# Patient Record
Sex: FEMALE | Race: OTHER | HISPANIC OR LATINO
[De-identification: names, ages, dates, MRNs, and addresses within clinical notes are randomized per-mention and may not be internally consistent; named-entity substitution may affect disease eponyms.]

---

## 2019-12-30 PROBLEM — Z00.00 ENCOUNTER FOR PREVENTIVE HEALTH EXAMINATION: Status: ACTIVE | Noted: 2019-12-30

## 2020-02-11 ENCOUNTER — APPOINTMENT (OUTPATIENT)
Dept: GASTROENTEROLOGY | Facility: CLINIC | Age: 45
End: 2020-02-11

## 2020-02-14 ENCOUNTER — APPOINTMENT (OUTPATIENT)
Dept: GASTROENTEROLOGY | Facility: CLINIC | Age: 45
End: 2020-02-14

## 2020-03-02 ENCOUNTER — APPOINTMENT (OUTPATIENT)
Dept: GASTROENTEROLOGY | Facility: CLINIC | Age: 45
End: 2020-03-02
Payer: COMMERCIAL

## 2020-03-02 VITALS
OXYGEN SATURATION: 97 % | BODY MASS INDEX: 28.35 KG/M2 | SYSTOLIC BLOOD PRESSURE: 106 MMHG | HEART RATE: 89 BPM | WEIGHT: 160 LBS | HEIGHT: 63 IN | DIASTOLIC BLOOD PRESSURE: 82 MMHG

## 2020-03-02 PROCEDURE — 99204 OFFICE O/P NEW MOD 45 MIN: CPT

## 2020-03-02 RX ORDER — LANSOPRAZOLE 30 MG/1
30 CAPSULE, DELAYED RELEASE ORAL DAILY
Refills: 0 | Status: ACTIVE | COMMUNITY
Start: 2020-03-02

## 2020-03-02 NOTE — HISTORY OF PRESENT ILLNESS
[de-identified] :  Patient with epigastric pain X 6 months. Burning pain. Lasts hours,  Worse with tomatoes and salsa. Resolves with prevacid  30 mg qd.  NO melena. + Ibuprofen for vericose vein pain.   number  # [id number]

## 2020-03-02 NOTE — PHYSICAL EXAM
[General Appearance - Alert] : alert [General Appearance - Well Nourished] : well nourished [General Appearance - Well Developed] : well developed [General Appearance - In No Acute Distress] : in no acute distress [Outer Ear] : the ears and nose were normal in appearance [Neck Appearance] : the appearance of the neck was normal [Sclera] : the sclera and conjunctiva were normal [Respiration, Rhythm And Depth] : normal respiratory rhythm and effort [Auscultation Breath Sounds / Voice Sounds] : lungs were clear to auscultation bilaterally [Exaggerated Use Of Accessory Muscles For Inspiration] : no accessory muscle use [Heart Rate And Rhythm] : heart rate was normal and rhythm regular [Apical Impulse] : the apical impulse was normal [Heart Sounds] : normal S1 and S2 [Abdomen Tenderness] : non-tender [Bowel Sounds] : normal bowel sounds [Abdomen Soft] : soft [Skin Color & Pigmentation] : normal skin color and pigmentation [Skin Turgor] : normal skin turgor [] : no rash [Impaired Insight] : insight and judgment were intact [Oriented To Time, Place, And Person] : oriented to person, place, and time [Affect] : the affect was normal

## 2020-03-02 NOTE — ASSESSMENT
[FreeTextEntry1] : A/P\par Epigastric pain, GERD\par Today's instructions for acid reflux include avoid provocative foods. For example citrus alcohol coffee chocolate mints. Smaller meals, no eating 3 hours prior to bedtime and elevate head of the bed prior to sleep.\par prevacid 30 mg qd\par  I discussed the risks and benefits of EGD and patient was given opportunity to ask questions. EGD to r/o Mcgarry's  esophagus, PUD, mass, AVM'S. Pt is medically optimized for EGD\par - will get cbc, cmp from primary MD\par

## 2020-09-27 DIAGNOSIS — Z01.818 ENCOUNTER FOR OTHER PREPROCEDURAL EXAMINATION: ICD-10-CM

## 2020-09-28 ENCOUNTER — APPOINTMENT (OUTPATIENT)
Dept: DISASTER EMERGENCY | Facility: CLINIC | Age: 45
End: 2020-09-28

## 2020-09-29 LAB — SARS-COV-2 N GENE NPH QL NAA+PROBE: NOT DETECTED

## 2020-10-01 ENCOUNTER — APPOINTMENT (OUTPATIENT)
Dept: GASTROENTEROLOGY | Facility: GI CENTER | Age: 45
End: 2020-10-01
Payer: COMMERCIAL

## 2020-10-01 ENCOUNTER — OUTPATIENT (OUTPATIENT)
Dept: OUTPATIENT SERVICES | Facility: HOSPITAL | Age: 45
LOS: 1 days | End: 2020-10-01
Payer: COMMERCIAL

## 2020-10-01 ENCOUNTER — RESULT REVIEW (OUTPATIENT)
Age: 45
End: 2020-10-01

## 2020-10-01 DIAGNOSIS — K21.9 GASTRO-ESOPHAGEAL REFLUX DISEASE WITHOUT ESOPHAGITIS: ICD-10-CM

## 2020-10-01 PROCEDURE — 88305 TISSUE EXAM BY PATHOLOGIST: CPT | Mod: 26

## 2020-10-01 PROCEDURE — 43239 EGD BIOPSY SINGLE/MULTIPLE: CPT

## 2020-10-01 PROCEDURE — 88342 IMHCHEM/IMCYTCHM 1ST ANTB: CPT | Mod: 26

## 2020-10-01 PROCEDURE — 88342 IMHCHEM/IMCYTCHM 1ST ANTB: CPT

## 2020-10-01 PROCEDURE — 88305 TISSUE EXAM BY PATHOLOGIST: CPT

## 2020-10-01 NOTE — REASON FOR VISIT
[Follow-Up: _____] : a [unfilled] follow-up visit [FreeTextEntry1] : gerd,epigastric pain [Endoscopy] : an endoscopy [FreeTextEntry2] : gerd, epigastric pain

## 2020-10-01 NOTE — PROCEDURE
[With Biopsy] : with biopsy [GERD] : GERD [Procedure Explained] : The procedure was explained [Allergies Reviewed] : allergies reviewed. [Risks] : Risks [Benefits] : benefits [Alternatives] : alternatives [Consent Obtained] : written consent was obtained prior to the procedure and is detailed in the patient's record [Patient] : the patient [Automated Blood Pressure Cuff] : automated blood pressure cuff [Cardiac Monitor] : cardiac monitor [Pulse Oximeter] : pulse oximeter [2] : 2 [Performed By: ___] : Performed by:  MARTHA [Normal] : Normal [Sent to Pathology] : was sent to pathology for analysis [Tolerated Well] : the patient tolerated the procedure well [Vital Signs Stable] : the vital signs were stable [de-identified] : Random  biopsies of body and antrum  to r/o HP

## 2020-10-01 NOTE — ASSESSMENT
[FreeTextEntry1] : A/P\par Normal EGD\par gerd,epigatric pain\par call in 1 week for HP biopsies\par continue prilosec 20 mg qd\par F/U in 3 months

## 2020-10-01 NOTE — PROCEDURE
[With Biopsy] : with biopsy [GERD] : GERD [Procedure Explained] : The procedure was explained [Allergies Reviewed] : allergies reviewed. [Risks] : Risks [Benefits] : benefits [Alternatives] : alternatives [Consent Obtained] : written consent was obtained prior to the procedure and is detailed in the patient's record [Patient] : the patient [Automated Blood Pressure Cuff] : automated blood pressure cuff [Cardiac Monitor] : cardiac monitor [Pulse Oximeter] : pulse oximeter [2] : 2 [Performed By: ___] : Performed by:  MARTHA [Normal] : Normal [Sent to Pathology] : was sent to pathology for analysis [Tolerated Well] : the patient tolerated the procedure well [Vital Signs Stable] : the vital signs were stable [de-identified] : Random  biopsies of body and antrum  to r/o HP

## 2020-10-01 NOTE — PHYSICAL EXAM
[General Appearance - Alert] : alert [General Appearance - In No Acute Distress] : in no acute distress [General Appearance - Well Nourished] : well nourished [General Appearance - Well Developed] : well developed [Sclera] : the sclera and conjunctiva were normal [Outer Ear] : the ears and nose were normal in appearance [Neck Appearance] : the appearance of the neck was normal [] : no respiratory distress [Respiration, Rhythm And Depth] : normal respiratory rhythm and effort [Exaggerated Use Of Accessory Muscles For Inspiration] : no accessory muscle use [Auscultation Breath Sounds / Voice Sounds] : lungs were clear to auscultation bilaterally [Apical Impulse] : the apical impulse was normal [Heart Rate And Rhythm] : heart rate was normal and rhythm regular [Heart Sounds] : normal S1 and S2 [Bowel Sounds] : normal bowel sounds [Abdomen Soft] : soft [Abdomen Tenderness] : non-tender [Skin Color & Pigmentation] : normal skin color and pigmentation [Oriented To Time, Place, And Person] : oriented to person, place, and time [Impaired Insight] : insight and judgment were intact [Affect] : the affect was normal

## 2020-10-05 LAB — SURGICAL PATHOLOGY STUDY: SIGNIFICANT CHANGE UP

## 2021-03-24 ENCOUNTER — TRANSCRIPTION ENCOUNTER (OUTPATIENT)
Age: 46
End: 2021-03-24

## 2022-07-29 ENCOUNTER — EMERGENCY (EMERGENCY)
Facility: HOSPITAL | Age: 47
LOS: 1 days | Discharge: HOME | End: 2022-07-29
Attending: EMERGENCY MEDICINE
Payer: COMMERCIAL

## 2022-07-29 VITALS
TEMPERATURE: 98 F | OXYGEN SATURATION: 98 % | SYSTOLIC BLOOD PRESSURE: 137 MMHG | WEIGHT: 190.04 LBS | HEART RATE: 95 BPM | RESPIRATION RATE: 16 BRPM | DIASTOLIC BLOOD PRESSURE: 71 MMHG

## 2022-07-29 PROCEDURE — 73110 X-RAY EXAM OF WRIST: CPT | Mod: 26,LT,76

## 2022-07-29 PROCEDURE — 29130 APPL FINGER SPLINT STATIC: CPT | Mod: LT

## 2022-07-29 PROCEDURE — 99284 EMERGENCY DEPT VISIT MOD MDM: CPT

## 2022-07-29 PROCEDURE — 99053 MED SERV 10PM-8AM 24 HR FAC: CPT

## 2022-07-29 PROCEDURE — 73000 X-RAY EXAM OF COLLAR BONE: CPT

## 2022-07-29 PROCEDURE — 99284 EMERGENCY DEPT VISIT MOD MDM: CPT | Mod: 25

## 2022-07-29 PROCEDURE — 73000 X-RAY EXAM OF COLLAR BONE: CPT | Mod: 26,LT

## 2022-07-29 PROCEDURE — 73110 X-RAY EXAM OF WRIST: CPT

## 2022-07-29 PROCEDURE — 73502 X-RAY EXAM HIP UNI 2-3 VIEWS: CPT

## 2022-07-29 PROCEDURE — 73502 X-RAY EXAM HIP UNI 2-3 VIEWS: CPT | Mod: 26,LT

## 2022-07-29 RX ORDER — ACETAMINOPHEN 500 MG
650 TABLET ORAL ONCE
Refills: 0 | Status: COMPLETED | OUTPATIENT
Start: 2022-07-29 | End: 2022-07-29

## 2022-07-29 RX ORDER — KETOROLAC TROMETHAMINE 30 MG/ML
30 SYRINGE (ML) INJECTION ONCE
Refills: 0 | Status: COMPLETED | OUTPATIENT
Start: 2022-07-29 | End: 2022-07-29

## 2022-07-29 RX ADMIN — Medication 650 MILLIGRAM(S): at 05:52

## 2022-07-29 NOTE — ED PROVIDER NOTE - NSFOLLOWUPINSTRUCTIONS_ED_ALL_ED_FT
- [place] ibuprofeno 600 mg cada 6 horas según sea necesario para el dolor  - Aplique hielo en el área afectada jhon 15 a 20 minutos cada pocas horas jhon los próximos días. Use tanto o tan frecuentemente susie desee.  - Elevar la extremidad según sea necesario  - Seguimiento con ortopedia dentro de 1-2 días    Esguince    Un esguince es un estiramiento o desgarro en christa de los tejidos duros similares a fibras (ligamentos) del cuerpo. [place] es causado por marie lesión en el área, susie un mecanismo de torsión. Los síntomas incluyen dolor, hinchazón o hematomas. Descanse tamar área jhon los próximos días y reanude lentamente la actividad cuando lo tolere. El hielo puede ayudar con la hinchazón y el dolor.    BUSQUE ATENCIÓN MÉDICA INMEDIATA SI TIENE ALGUNO DE LOS SIGUIENTES SÍNTOMAS: empeoramiento del dolor, incapacidad para  tamar parte del cuerpo, entumecimiento u hormigueo.    - Take ibuprofen 600mg every 6 hours as needed for pain  - Apply ice to affected area for 15-20 minutes every few hours for the next few days.  Use as much or as frequently as desired.   - Elevate extremity as needed  - Follow up with orthopedics within 1-2 days    Sprain    A sprain is a stretch or tear in one of the tough, fiber-like tissues (ligaments) in your body. This is caused by an injury to the area such as a twisting mechanism. Symptoms include pain, swelling, or bruising. Rest that area over the next several days and slowly resume activity when tolerated. Ice can help with swelling and pain.     SEEK IMMEDIATE MEDICAL CARE IF YOU HAVE ANY OF THE FOLLOWING SYMPTOMS: worsening pain, inability to move that body part, numbness or tingling.

## 2022-07-29 NOTE — ED PROVIDER NOTE - CARE PROVIDER_API CALL
Tom Graham)  Orthopaedic Surgery  [address] [place] [address]  Phone: [phone]  Fax: [phone]  Follow Up Time:

## 2022-07-29 NOTE — ED PROVIDER NOTE - PHYSICAL EXAMINATION
Gen: No acute distress, non toxic  HEENT: Mucous membranes moist, pink conjunctivae, EOMI  CV: RRR, nl s1/s2.  Resp: CTAB, normal rate and effort  GI: Abdomen soft, NT, ND. No rebound, no guarding  : No CVAT  Neuro: A&O x 3, moving all 4 extremities  MSK: +tenderness to palpation left distal radius, +left navicular bone tenderness, +tenderness left gluteal @ greater trochanter, + tenderness left clavicle. +left shoulder weakness. No left knee, ankle, elbow tenderness. No midline spine/paraspinal or joint tenderness to palpation  Skin: No rashes. intact and perfused. Gen: No acute distress, non toxic  HEENT: Mucous membranes moist, pink conjunctivae, EOMI  CV: RRR, nl s1/s2.  Resp: CTAB, normal rate and effort  GI: Abdomen soft, NT, ND. No rebound, no guarding  : No CVAT  Neuro: A&O x 3, moving all 4 extremities  MSK: +tenderness to palpation left distal radius, +left scaphoid bone tenderness, +tenderness left gluteal @ greater trochanter, + tenderness left clavicle. +left shoulder weakness. No left knee, ankle, elbow tenderness. No midline spine/paraspinal or joint tenderness to palpation  Skin: No rashes. intact and perfused.

## 2022-07-29 NOTE — ED PROVIDER NOTE - NS ED ATTENDING STATEMENT MOD
This was a shared visit with the MAKENZIE. I reviewed and verified the documentation and independently performed the documented:

## 2022-07-29 NOTE — ED PROVIDER NOTE - NS ED ROS FT
Gen: denies fever, chills, fatigue, weight loss  Skin: denies rashes, laceration, bruising  HEENT: denies visual changes, ear pain, nasal congestion, throat pain  Respiratory: denies RENTERIA, SOB, cough, wheezing  Cardiovascular: denies chest pain, palpitations, diaphoresis  GI: denies abdominal pain, n/v/d  : denies dysuria, frequency, urgency, bowel/bladder incontinence  MSK: Admits left shoulder pain, left hip pain, left wrist pain. Denies joint swelling/pain, back pain, neck pain  Neuro: denies headache, dizziness, weakness, numbness  Psych: denies anxiety, depression, SI/HI, visual/auditory hallucinations

## 2022-07-29 NOTE — ED ADULT NURSE NOTE - OBJECTIVE STATEMENT
s/p fall at work onto L side. c/o L arm/wrIst pain, L shoulder pain. L hip pain. denies  headstrike.

## 2022-07-29 NOTE — ED PROVIDER NOTE - IV ALTEPLASE EXCL ABS HIDDEN
show Complex Repair And M Plasty Text: The defect edges were debeveled with a #15 scalpel blade.  The primary defect was closed partially with a complex linear closure.  Given the location of the remaining defect, shape of the defect and the proximity to free margins an M plasty was deemed most appropriate for complete closure of the defect.  Using a sterile surgical marker, an appropriate advancement flap was drawn incorporating the defect and placing the expected incisions within the relaxed skin tension lines where possible.    The area thus outlined was incised deep to adipose tissue with a #15 scalpel blade.  The skin margins were undermined to an appropriate distance in all directions utilizing iris scissors.

## 2022-07-29 NOTE — ED PROVIDER NOTE - PATIENT PORTAL LINK FT
You can access the FollowMyHealth Patient Portal offered by  by registering at the following website: [email or web address]. By joining [facility]’s FollowMyHealth portal, you will also be able to view your health information using other applications (apps) compatible with our system.

## 2022-07-29 NOTE — ED PROVIDER NOTE - ATTENDING APP SHARED VISIT CONTRIBUTION OF CARE
Low mechanism fall with negative XRs but does have scaphoid tenderness on wrist exam, placed in thumb spica splint and referred to orthopedics.

## 2022-07-29 NOTE — ED PROVIDER NOTE - CLINICAL SUMMARY MEDICAL DECISION MAKING FREE TEXT BOX
46 year old female s/p fall from standing height. +tenderness left navicular, +tenderness left clavicular, +tenderness at greater trochanter. Xray, analgesia, re-assess. 46 year old female s/p fall from standing height. +tenderness left navicular, +tenderness left clavicular, +tenderness at greater trochanter. Xray, analgesia, re-assess.   Pt placed in thumb spica splint.

## 2022-07-29 NOTE — ED PROVIDER NOTE - OBJECTIVE STATEMENT
46 year old female presents 2 hours s/p fall from standing height. Pt reports falling when leaving work on floor landing on left side. Pt reports left shoulder pain 6/10, left wrist pain 4/10, and left hip pain 7/10. Pt states she is able to lift left arm at shoulder but with pain. Pt reports being able bend wrist but feels pain when making a fist. Pt reports left hip pain. Pt denies hitting head, LOC, using blood thinners, back pain, neck pain.

## 2022-07-29 NOTE — ED ADULT TRIAGE NOTE - CHIEF COMPLAINT QUOTE
BIBEMS c/o left wrist and hip pain after mechanical fall at work. +ROM in L wrist and L leg. No deformity noted at triage. Patient denies LOC, head injury.

## 2022-08-09 PROBLEM — Z78.9 OTHER SPECIFIED HEALTH STATUS: Chronic | Status: ACTIVE | Noted: 2022-07-29

## 2022-08-17 ENCOUNTER — APPOINTMENT (OUTPATIENT)
Dept: ORTHOPEDIC SURGERY | Facility: CLINIC | Age: 47
End: 2022-08-17

## 2022-08-17 VITALS
DIASTOLIC BLOOD PRESSURE: 82 MMHG | SYSTOLIC BLOOD PRESSURE: 131 MMHG | TEMPERATURE: 98.1 F | WEIGHT: 160 LBS | HEART RATE: 82 BPM | HEIGHT: 63 IN | BODY MASS INDEX: 28.35 KG/M2

## 2022-08-17 DIAGNOSIS — M25.512 PAIN IN LEFT SHOULDER: ICD-10-CM

## 2022-08-17 PROCEDURE — 99072 ADDL SUPL MATRL&STAF TM PHE: CPT

## 2022-08-17 PROCEDURE — 99203 OFFICE O/P NEW LOW 30 MIN: CPT

## 2022-08-17 RX ORDER — IBUPROFEN 600 MG/1
600 TABLET, FILM COATED ORAL 3 TIMES DAILY
Qty: 60 | Refills: 3 | Status: ACTIVE | COMMUNITY
Start: 2022-08-17 | End: 1900-01-01

## 2022-08-17 NOTE — HISTORY OF PRESENT ILLNESS
[FreeTextEntry1] : The patient is a 46-year-old female presents with a found member today for evaluation of left shoulder pain.  She states she suffered a fall from a standing height at work.  She works at [employer].  She landed on the left arm.  She was seen at [facility] emergency department on July 29.  X-rays were obtained.  No acute fracture, subluxation or dislocation was noted.  She continues to have pain, she comes in today for evaluation.  The patient states the pain is made worse with activity and relieved with rest.  Aching, 4 out of 10.

## 2022-08-17 NOTE — REASON FOR VISIT
[Initial Visit] : an initial visit for [Workers' Comp: Date of Injury: _______] : This visit is related to worker's compensation. Date of Injury: [unfilled] [Family Member] : family member [FreeTextEntry2] : left shoulder pain

## 2022-08-17 NOTE — PHYSICAL EXAM
[de-identified] : Physical Exam:\par General: Well appearing, no acute distress, A&O\par Neurologic: A&Ox3, No focal deficits\par Head: NCAT without abrasions, lacerations, or ecchymosis to head, face, or scalp\par Respiratory: Equal chest wall expansion bilaterally, no accessory muscle use\par Lymphatic: No lymphadenopathy palpated\par Skin: Warm and dry\par Psychiatric: Normal mood and affect\par \par LUE:\par SILT r/m/u\par Fires AIN/PIN/ulnar\par 2+ radial pulse\par brisk capillary refill\par Positive pain along the rotator cuff and biceps tendon of the shoulder.\par Positive impingement signs.\par Slightly decreased active but near full passive range of motion [de-identified] : Multiple plain x-rays were reviewed from the emergency department, no acute findings are noted.

## 2022-08-17 NOTE — DISCUSSION/SUMMARY
[de-identified] : The patient presents with left anterior shoulder pain for 2 weeks. The patient has tried conservative measures of treatment including rest and occasional anti-inflammatories. The patient has positive impingement testing on clinical evaluation that  consistent with rotator cuff tendinitis. I discussed the role of the rotator cuff in shoulder function including dynamic stability and range of motion, as well as pathology that causes shoulder pain including the proximal biceps tendon, subacromial impingement, bursitis and rotator cuff dysfunction/tendinopathy. On clinical evaluation, I believe that the patient's primary concern is the pain.\par \par Nonoperative modalities of treatment include physical therapy for range of motion therapy, rotator cuff strengthening/scapular stabilization, NSAID's (if able), and subacromial corticosteroid injection. Surgical intervention would include arthroscopic rotator cuff repair for tears that are not amenable to surgical nonoperative treatment or fail a trial of nonoperative management.\par \par Considering the patient's presentation, I've recommended a trial of nonoperative treatment that includes formal physical therapy and scheduled anti-inflammatories. if there is no improvement in a month, she will give us a call and we can discuss how to proceed at that time but will likely refer to the sports medicine service.  No orthopedic trauma intervention is required but we will help facilitate future care as needed. The patient may follow up as needed.  If they have any questions or concerns, I can be available at any time for further discussion.\par \par Tom Graham MD\par Orthopaedic Trauma Surgeon\par [facility]\par [facility] [place]\par Director Orthopaedic Trauma, [facility]\par \par \par \par

## 2023-09-25 NOTE — ED PROVIDER NOTE - ATTENDING SHARED VISIT SELECTORS
Follow up with PCP within 1-2 weeks of discharge. If you are in need of a general medicine physician and post-discharge medical follow-up for further care/recommendations you may contact the [facility] for an appointment at [phone].     Follow up with neurology within 1-2 weeks of discharge. You may follow up at [address] [place] [id number]. Please call [phone] for an appointment.   
Medical Decision Making